# Patient Record
Sex: FEMALE | Race: OTHER | NOT HISPANIC OR LATINO | Employment: UNEMPLOYED | URBAN - METROPOLITAN AREA
[De-identification: names, ages, dates, MRNs, and addresses within clinical notes are randomized per-mention and may not be internally consistent; named-entity substitution may affect disease eponyms.]

---

## 2022-06-13 ENCOUNTER — HOSPITAL ENCOUNTER (EMERGENCY)
Facility: HOSPITAL | Age: 27
Discharge: HOME | End: 2022-06-13
Attending: EMERGENCY MEDICINE
Payer: MEDICARE

## 2022-06-13 VITALS
HEART RATE: 87 BPM | BODY MASS INDEX: 22.5 KG/M2 | DIASTOLIC BLOOD PRESSURE: 54 MMHG | HEIGHT: 66 IN | WEIGHT: 140 LBS | RESPIRATION RATE: 16 BRPM | TEMPERATURE: 100 F | OXYGEN SATURATION: 97 % | SYSTOLIC BLOOD PRESSURE: 97 MMHG

## 2022-06-13 DIAGNOSIS — R31.9 URINARY TRACT INFECTION WITH HEMATURIA, SITE UNSPECIFIED: ICD-10-CM

## 2022-06-13 DIAGNOSIS — F23 ACUTE PSYCHOSIS: Primary | ICD-10-CM

## 2022-06-13 DIAGNOSIS — Z00.8 MEDICAL CLEARANCE FOR PSYCHIATRIC ADMISSION: ICD-10-CM

## 2022-06-13 DIAGNOSIS — F19.10 POLYSUBSTANCE ABUSE: ICD-10-CM

## 2022-06-13 DIAGNOSIS — N39.0 URINARY TRACT INFECTION WITH HEMATURIA, SITE UNSPECIFIED: ICD-10-CM

## 2022-06-13 LAB
ALBUMIN SERPL BCP-MCNC: 3.7 G/DL (ref 3.5–5.2)
ALP SERPL-CCNC: 76 U/L (ref 55–135)
ALT SERPL W/O P-5'-P-CCNC: 25 U/L (ref 10–44)
ANION GAP SERPL CALC-SCNC: 8 MMOL/L (ref 8–16)
ANISOCYTOSIS BLD QL SMEAR: SLIGHT
APAP SERPL-MCNC: <3 UG/ML (ref 10–20)
AST SERPL-CCNC: 23 U/L (ref 10–40)
B-HCG UR QL: NEGATIVE
BASOPHILS # BLD AUTO: 0.03 K/UL (ref 0–0.2)
BASOPHILS NFR BLD: 0.3 % (ref 0–1.9)
BILIRUB SERPL-MCNC: 0.2 MG/DL (ref 0.1–1)
BUN SERPL-MCNC: 13 MG/DL (ref 6–20)
CALCIUM SERPL-MCNC: 9.4 MG/DL (ref 8.7–10.5)
CHLORIDE SERPL-SCNC: 113 MMOL/L (ref 95–110)
CO2 SERPL-SCNC: 21 MMOL/L (ref 23–29)
CREAT SERPL-MCNC: 0.7 MG/DL (ref 0.5–1.4)
CTP QC/QA: YES
CTP QC/QA: YES
DACRYOCYTES BLD QL SMEAR: ABNORMAL
DIFFERENTIAL METHOD: ABNORMAL
EOSINOPHIL # BLD AUTO: 0.1 K/UL (ref 0–0.5)
EOSINOPHIL NFR BLD: 1.4 % (ref 0–8)
ERYTHROCYTE [DISTWIDTH] IN BLOOD BY AUTOMATED COUNT: 15.6 % (ref 11.5–14.5)
EST. GFR  (AFRICAN AMERICAN): >60 ML/MIN/1.73 M^2
EST. GFR  (NON AFRICAN AMERICAN): >60 ML/MIN/1.73 M^2
ETHANOL SERPL-MCNC: <10 MG/DL
GLUCOSE SERPL-MCNC: 90 MG/DL (ref 70–110)
HCT VFR BLD AUTO: 37.1 % (ref 37–48.5)
HGB BLD-MCNC: 11.5 G/DL (ref 12–16)
HYPOCHROMIA BLD QL SMEAR: ABNORMAL
IMM GRANULOCYTES # BLD AUTO: 0.02 K/UL (ref 0–0.04)
IMM GRANULOCYTES NFR BLD AUTO: 0.2 % (ref 0–0.5)
LYMPHOCYTES # BLD AUTO: 1.9 K/UL (ref 1–4.8)
LYMPHOCYTES NFR BLD: 21.6 % (ref 18–48)
MCH RBC QN AUTO: 27.6 PG (ref 27–31)
MCHC RBC AUTO-ENTMCNC: 31 G/DL (ref 32–36)
MCV RBC AUTO: 89 FL (ref 82–98)
MONOCYTES # BLD AUTO: 1 K/UL (ref 0.3–1)
MONOCYTES NFR BLD: 11.1 % (ref 4–15)
NEUTROPHILS # BLD AUTO: 5.9 K/UL (ref 1.8–7.7)
NEUTROPHILS NFR BLD: 65.4 % (ref 38–73)
NRBC BLD-RTO: 0 /100 WBC
OVALOCYTES BLD QL SMEAR: ABNORMAL
PLATELET # BLD AUTO: 98 K/UL (ref 150–450)
PLATELET BLD QL SMEAR: ABNORMAL
PMV BLD AUTO: 12.2 FL (ref 9.2–12.9)
POIKILOCYTOSIS BLD QL SMEAR: SLIGHT
POTASSIUM SERPL-SCNC: 4.4 MMOL/L (ref 3.5–5.1)
PROT SERPL-MCNC: 6.5 G/DL (ref 6–8.4)
RBC # BLD AUTO: 4.16 M/UL (ref 4–5.4)
SALICYLATES SERPL-MCNC: <5 MG/DL (ref 15–30)
SARS-COV-2 RDRP RESP QL NAA+PROBE: NEGATIVE
SODIUM SERPL-SCNC: 142 MMOL/L (ref 136–145)
T4 FREE SERPL-MCNC: 1.03 NG/DL (ref 0.71–1.51)
TARGETS BLD QL SMEAR: ABNORMAL
TSH SERPL DL<=0.005 MIU/L-ACNC: 0.17 UIU/ML (ref 0.4–4)
WBC # BLD AUTO: 8.98 K/UL (ref 3.9–12.7)

## 2022-06-13 PROCEDURE — U0002 COVID-19 LAB TEST NON-CDC: HCPCS | Performed by: EMERGENCY MEDICINE

## 2022-06-13 PROCEDURE — G0425 INPT/ED TELECONSULT30: HCPCS | Mod: GT,,, | Performed by: PSYCHIATRY & NEUROLOGY

## 2022-06-13 PROCEDURE — 93010 EKG 12-LEAD: ICD-10-PCS | Mod: ,,, | Performed by: INTERNAL MEDICINE

## 2022-06-13 PROCEDURE — 84439 ASSAY OF FREE THYROXINE: CPT | Performed by: EMERGENCY MEDICINE

## 2022-06-13 PROCEDURE — 82077 ASSAY SPEC XCP UR&BREATH IA: CPT | Performed by: EMERGENCY MEDICINE

## 2022-06-13 PROCEDURE — 80179 DRUG ASSAY SALICYLATE: CPT | Performed by: EMERGENCY MEDICINE

## 2022-06-13 PROCEDURE — 96372 THER/PROPH/DIAG INJ SC/IM: CPT | Performed by: EMERGENCY MEDICINE

## 2022-06-13 PROCEDURE — 81025 URINE PREGNANCY TEST: CPT | Performed by: EMERGENCY MEDICINE

## 2022-06-13 PROCEDURE — 85025 COMPLETE CBC W/AUTO DIFF WBC: CPT | Performed by: EMERGENCY MEDICINE

## 2022-06-13 PROCEDURE — 93010 ELECTROCARDIOGRAM REPORT: CPT | Mod: ,,, | Performed by: INTERNAL MEDICINE

## 2022-06-13 PROCEDURE — G0425 PR INPT TELEHEALTH CONSULT 30M: ICD-10-PCS | Mod: GT,,, | Performed by: PSYCHIATRY & NEUROLOGY

## 2022-06-13 PROCEDURE — 80053 COMPREHEN METABOLIC PANEL: CPT | Performed by: EMERGENCY MEDICINE

## 2022-06-13 PROCEDURE — 93005 ELECTROCARDIOGRAM TRACING: CPT

## 2022-06-13 PROCEDURE — 99285 EMERGENCY DEPT VISIT HI MDM: CPT

## 2022-06-13 PROCEDURE — 63700000 PHARM REV CODE 250 ALT 637 W/O HCPCS: Performed by: EMERGENCY MEDICINE

## 2022-06-13 PROCEDURE — 84443 ASSAY THYROID STIM HORMONE: CPT | Performed by: EMERGENCY MEDICINE

## 2022-06-13 PROCEDURE — 25000003 PHARM REV CODE 250: Performed by: EMERGENCY MEDICINE

## 2022-06-13 PROCEDURE — 63600175 PHARM REV CODE 636 W HCPCS: Performed by: EMERGENCY MEDICINE

## 2022-06-13 PROCEDURE — 80143 DRUG ASSAY ACETAMINOPHEN: CPT | Performed by: EMERGENCY MEDICINE

## 2022-06-13 RX ORDER — OLANZAPINE 10 MG/2ML
10 INJECTION, POWDER, FOR SOLUTION INTRAMUSCULAR ONCE AS NEEDED
Status: DISCONTINUED | OUTPATIENT
Start: 2022-06-13 | End: 2022-06-13

## 2022-06-13 RX ORDER — CEFTRIAXONE 500 MG/1
1000 INJECTION, POWDER, FOR SOLUTION INTRAMUSCULAR; INTRAVENOUS
Status: COMPLETED | OUTPATIENT
Start: 2022-06-13 | End: 2022-06-13

## 2022-06-13 RX ORDER — CEPHALEXIN 500 MG/1
500 CAPSULE ORAL EVERY 12 HOURS
Qty: 14 CAPSULE | Refills: 0 | Status: ON HOLD | OUTPATIENT
Start: 2022-06-13 | End: 2022-06-17 | Stop reason: HOSPADM

## 2022-06-13 RX ORDER — METRONIDAZOLE 500 MG/1
2000 TABLET ORAL
Status: COMPLETED | OUTPATIENT
Start: 2022-06-13 | End: 2022-06-13

## 2022-06-13 RX ORDER — AZITHROMYCIN 250 MG/1
1000 TABLET, FILM COATED ORAL
Status: COMPLETED | OUTPATIENT
Start: 2022-06-13 | End: 2022-06-13

## 2022-06-13 RX ADMIN — METRONIDAZOLE 2000 MG: 500 TABLET ORAL at 07:06

## 2022-06-13 RX ADMIN — AZITHROMYCIN MONOHYDRATE 1000 MG: 250 TABLET ORAL at 07:06

## 2022-06-13 RX ADMIN — CEFTRIAXONE SODIUM 1000 MG: 500 INJECTION, POWDER, FOR SOLUTION INTRAMUSCULAR; INTRAVENOUS at 07:06

## 2022-06-13 NOTE — ED NOTES
Pt back to bed from bathroom without incident. Pt given meal tray and informed of need for completion of VS, EKG and blood draw. Pt verbalized understanding. Upon attempt to obtain pulse ox, pt became verbally aggressive and attempted to throw food at RN. Meal tray removed. Security remains at BS

## 2022-06-13 NOTE — ED NOTES
"Mood is labile. Pt. Is clapping and singing out loud "peanut butter and jelly". Pt. Was given a regular breakfast tray, removed from her when she attempted to throw food at staff.   "

## 2022-06-13 NOTE — ED NOTES
Telepsych. Consult placed through [facility] transfer center. Awaiting Dr. Sinclair to call.

## 2022-06-13 NOTE — ED NOTES
Pt became physically aggressive swinging closed fist at RN when attempting to obtain covid swab. Security called for assistance. Security to BS. Covid swab collected. Pt tolerated well. Will continue to monitor.

## 2022-06-13 NOTE — ED NOTES
Spoke to transfer center and pt. Accepted for transfer to [facility]. Unit. Transfer center will arrange transport. Facility requesting 1hr delay for report.

## 2022-06-13 NOTE — ED NOTES
"Pt. Arrives via [facility] ems after walking up to a police car and reporting that she wanted to be transported to hospital because her foot was itching after she was bitten by an Oyster. She reports that she is homeless, picked up on [address]. She is barefooted, disheveled and foul smelling. Mood is labile and pt. Yells out and curses staff and physician at times. She answers some random questions. She states she is on Seroquel for bipolar and no other meds.. she tells physician she is in a hotel and wants to sleep. She then states she is here because "Shrimp are eating my pussy". She does not answer orientation questions (current location, name, birthplace, family). She does not consistently verbalize organized thoughts. Rambles incoherently at times. Pt. Not credible historian during abuse/suicide/fall screening.   "

## 2022-06-13 NOTE — ED PROVIDER NOTES
"Encounter Date: 6/13/2022    SCRIBE #1 NOTE: I, Nicole Maier, am scribing for, and in the presence of, Dr. Anderson.       History     Chief Complaint   Patient presents with    other    homelessness     Homeless and walked up to police telling them her foot itches after she was bit by an oyster.      Lidia Ortiz is a 26 y.o. female presenting with other. Patient has a past medical history of Bipolar disorder. Patient states that she has been drinking today, but is unsure of how much. Patient then adds that she is here because "shrimp are living in my pussy." Patient also states that the ED "be a hotel" today.      The history is provided by the patient.     Review of patient's allergies indicates:  No Known Allergies  Past Medical History:   Diagnosis Date    Bipolar disorder, unspecified      No past surgical history on file.  No family history on file.     Review of Systems   Constitutional: Negative for fever.   HENT: Negative for sore throat.    Respiratory: Negative for shortness of breath.    Cardiovascular: Negative for chest pain.   Gastrointestinal: Negative for nausea.   Genitourinary: Negative for dysuria.   Musculoskeletal: Negative for back pain.   Skin: Negative for rash.   Neurological: Negative for weakness.   Hematological: Does not bruise/bleed easily.       Physical Exam     Initial Vitals   BP Pulse Resp Temp SpO2   06/13/22 0758 06/13/22 0758 06/13/22 0758 06/13/22 0934 --   90/62 87 18 97.8 °F (36.6 °C)       MAP       --                Physical Exam    Nursing note and vitals reviewed.  HENT:   Head: Atraumatic.   Eyes: Conjunctivae and EOM are normal.   Neck:   Normal range of motion.  Cardiovascular: Exam reveals no gallop and no friction rub.    No murmur heard.  Pulmonary/Chest: Breath sounds normal. No respiratory distress.   Abdominal: Abdomen is soft. There is no abdominal tenderness.   Musculoskeletal:         General: No edema. Normal range of motion.      Cervical back: Normal " range of motion.     Neurological: She is alert.   Moving all extremities   Psychiatric:   Patient not cooperative all questioning.  Bizarre affect.  Disorganized thought process.  Inappropriate laughter.         ED Course   Procedures  Labs Reviewed   CBC W/ AUTO DIFFERENTIAL - Abnormal; Notable for the following components:       Result Value    Hemoglobin 11.5 (*)     MCHC 31.0 (*)     RDW 15.6 (*)     Platelets 98 (*)     Platelet Estimate Decreased (*)     All other components within normal limits   COMPREHENSIVE METABOLIC PANEL - Abnormal; Notable for the following components:    Chloride 113 (*)     CO2 21 (*)     All other components within normal limits   TSH - Abnormal; Notable for the following components:    TSH 0.175 (*)     All other components within normal limits   URINALYSIS, REFLEX TO URINE CULTURE - Abnormal; Notable for the following components:    Appearance, UA Cloudy (*)     Specific Gravity, UA >1.030 (*)     Protein, UA 1+ (*)     Occult Blood UA 3+ (*)     Leukocytes, UA 3+ (*)     All other components within normal limits    Narrative:     Specimen Source->Urine   DRUG SCREEN PANEL, URINE EMERGENCY - Abnormal; Notable for the following components:    Cocaine (Metab.) Presumptive Positive (*)     THC Presumptive Positive (*)     All other components within normal limits    Narrative:     Specimen Source->Urine   URINALYSIS MICROSCOPIC - Abnormal; Notable for the following components:    RBC, UA >100 (*)     WBC, UA >100 (*)     Bacteria Many (*)     All other components within normal limits    Narrative:     Specimen Source->Urine   ACETAMINOPHEN LEVEL - Abnormal; Notable for the following components:    Acetaminophen (Tylenol), Serum <3.0 (*)     All other components within normal limits   SALICYLATE LEVEL - Abnormal; Notable for the following components:    Salicylate Lvl <5.0 (*)     All other components within normal limits   C. TRACHOMATIS/N. GONORRHOEAE BY AMP DNA   CULTURE, URINE  "  C. TRACHOMATIS/N. GONORRHOEAE BY AMP DNA   ALCOHOL,MEDICAL (ETHANOL)   T4, FREE   SARS-COV-2 RDRP GENE   POCT URINE PREGNANCY          Imaging Results    None          Medications   cefTRIAXone injection 1,000 mg (has no administration in time range)   azithromycin tablet 1,000 mg (has no administration in time range)   metroNIDAZOLE tablet 2,000 mg (has no administration in time range)     Medical Decision Making:   Initial Assessment:   26-year-old female presents via EMS for altered mental status.  Patient is not forthcoming at all with her complaints but randomly will say that "shrimps are living in my p*ssy."  She laughs inappropriately during the exam.  Unclear if she is having a mental health crisis or intoxicated.  PEC completed. EKG reviewed and interpreted myself shows no evidence of acute ischemia.  Urinalysis with evidence of infection.  UDS positive for THC and cocaine.  Patient's labs are otherwise unremarkable.  As patient is not cooperative with exam, will treat presumptively for cervicitis and Trichomonas.  Patient medically clear for psychiatric evaluation.                        Clinical Impression:   Final diagnoses:  [Z00.8] Medical clearance for psychiatric admission  [F23] Acute psychosis (Primary)  [F19.10] Polysubstance abuse  [N39.0, R31.9] Urinary tract infection with hematuria, site unspecified          ED Disposition Condition    Transfer to Psych Facility         ED Prescriptions     None        Follow-up Information    None          Saleem Anderson MD  06/13/22 1640    "

## 2022-06-13 NOTE — ED NOTES
Lab at bedside. Pt. Became agitated and aggressive, screaming and thrashing when stuck for labs. Lab was unable to complete blood collection. MD updated.

## 2022-06-13 NOTE — CONSULTS
"[facility]  Psychiatry  Telepsychiatry Consult Note    Please see previous notes:    Patient agreeable to consultation via telepsychiatry.    Tele-Consultation from Psychiatry started: 6/13/2022 at 1530  The chief complaint leading to psychiatric consultation is: Bizarre behavior  This consultation was requested by the Emergency Department attending physician.  The location of the consulting psychiatrist is [place], [place].  The patient location is  [facility] EMERGENCY DEPARTMENT   The patient arrived at the ED at: unknown  Also present with the patient at the time of the consultation: no one    Patient Identification:   Lidia Ortiz is a 26 y.o. female.    Patient information was obtained from patient, past medical records and ER records.  Patient presented involuntarily to the Emergency Department      Inpatient consult to Telemedicine - Psychiatry  Consult performed by: Gelacio Sinclair DO  Consult ordered by: Saleem Anderson MD        Consult Start Time: 06/13/2022 15:30 CDT  Consult End Time: 06/13/2022 16:15 CDT        Subjective:     History of Present Illness:  Per ED MD  Homeless and walked up to police telling them her foot itches after she was bit by an oyster.       Lidia Ortiz is a 26 y.o. female presenting with other. Patient has a past medical history of Bipolar disorder. Patient states that she has been drinking today, but is unsure of how much. Patient then adds that she is here because "shrimp are living in my pussy." Patient also states that the ED "be a hotel" today.    Psychiatric Evaluation  No previous psychiatric encounters documented. Patient with bizarre behavior and is unable to responds to my questions in a logical fashion. States she is in the hospital because "I have to brush my teeth." Utox pending.    Psychiatric History:   MYKE    Substance Abuse History:  MYKE    Legal History: Past charges/incarcerations: MYKE    Family Psychiatric History: MYKE      Social History:  MYKE    Psychiatric " Mental Status Exam:  Arousal: alert  Sensorium/Orientation: oriented to MYKE  Behavior/Cooperation: bizarre   Speech: increased latency of response  Language: not tested  Mood: MYKE  Affect: inappropriate  Thought Process: illogical  Thought Content:   Auditory hallucinations: MYKE  Visual hallucinations: MYKE  Paranoia: MYKE  Delusions:  YES:       Suicidal ideation: MYKE  Homicidal ideation: MYKE  Attention/Concentration:  intattentive  Memory:    Recent:  MYKE   Remote: MYKE     Fund of Knowledge: unable to assess   Abstract reasoning: MYKE  Insight: poor awareness of illness  Judgment: impaired due to psychosis      Past Medical History:   Past Medical History:   Diagnosis Date    Bipolar disorder, unspecified       Laboratory Data:   Labs Reviewed   CBC W/ AUTO DIFFERENTIAL - Abnormal; Notable for the following components:       Result Value    Hemoglobin 11.5 (*)     MCHC 31.0 (*)     RDW 15.6 (*)     Platelets 98 (*)     Platelet Estimate Decreased (*)     All other components within normal limits   COMPREHENSIVE METABOLIC PANEL - Abnormal; Notable for the following components:    Chloride 113 (*)     CO2 21 (*)     All other components within normal limits   TSH - Abnormal; Notable for the following components:    TSH 0.175 (*)     All other components within normal limits   URINALYSIS, REFLEX TO URINE CULTURE - Abnormal; Notable for the following components:    Appearance, UA Cloudy (*)     Specific Gravity, UA >1.030 (*)     Protein, UA 1+ (*)     Occult Blood UA 3+ (*)     Leukocytes, UA 3+ (*)     All other components within normal limits    Narrative:     Specimen Source->Urine   DRUG SCREEN PANEL, URINE EMERGENCY - Abnormal; Notable for the following components:    Cocaine (Metab.) Presumptive Positive (*)     THC Presumptive Positive (*)     All other components within normal limits    Narrative:     Specimen Source->Urine   URINALYSIS MICROSCOPIC - Abnormal; Notable for the following components:    RBC, UA  >100 (*)     WBC, UA >100 (*)     Bacteria Many (*)     All other components within normal limits    Narrative:     Specimen Source->Urine   ACETAMINOPHEN LEVEL - Abnormal; Notable for the following components:    Acetaminophen (Tylenol), Serum <3.0 (*)     All other components within normal limits   SALICYLATE LEVEL - Abnormal; Notable for the following components:    Salicylate Lvl <5.0 (*)     All other components within normal limits   C. TRACHOMATIS/N. GONORRHOEAE BY AMP DNA   CULTURE, URINE   C. TRACHOMATIS/N. GONORRHOEAE BY AMP DNA   ALCOHOL,MEDICAL (ETHANOL)   T4, FREE   SARS-COV-2 RDRP GENE   POCT URINE PREGNANCY       Neurological History:  Seizures: MYKE  Head trauma: MYKE    Allergies:   Review of patient's allergies indicates:  No Known Allergies    Medications in ER:   Medications   cefTRIAXone injection 1,000 mg (has no administration in time range)   azithromycin tablet 1,000 mg (has no administration in time range)   metroNIDAZOLE tablet 2,000 mg (has no administration in time range)       Medications at home:   Current Facility-Administered Medications:     azithromycin tablet 1,000 mg, 1,000 mg, Oral, ED 1 Time, Saleem Anderson MD    cefTRIAXone injection 1,000 mg, 1,000 mg, Intramuscular, ED 1 Time, Saleem Anderson MD    metroNIDAZOLE tablet 2,000 mg, 2,000 mg, Oral, ED 1 Time, Saleem Anderson MD    Current Outpatient Medications:     cephALEXin (KEFLEX) 500 MG capsule, Take 1 capsule (500 mg total) by mouth every 12 (twelve) hours. for 7 days, Disp: 14 capsule, Rfl: 0      No new subjective & objective note has been filed under this hospital service since the last note was generated.      Assessment - Diagnosis - Goals:     Diagnosis/Impression:   Unspecified Psychosis      RECOMMENDATIONS:     DISPOSITION: Once medically cleared;    Seek Involuntary Inpatient Psychiatric admission for stabilization of acute psychiatric symptoms and until a safe disposition plan is enacted. The pt &/or  their family was informed that the pt will be transferred to an Inpt unit per ED placement team.     PSYCHIATRIC MEDICATIONS  · Scheduled- Risperidone 0.5 mg PO BID  · PRN-  Olanzapine 10 mg PO/IM q 8 hrs for non-redirectable agitation    LEGAL   Seek/continue PEC because pt is gravely disabled. Please provide with 1:1 sitter.     More than 50% of the time was spent counseling/coordinating care    Consulting clinician was informed of the encounter and consult note.      Gelacio Sinclair,    Psychiatry  [facility]

## 2022-06-14 NOTE — ED NOTES
Pt. Transported to vehicle with transport personal and hospital security without incidence. Pt. Is stable without distress.

## 2022-06-14 NOTE — ED NOTES
SPD here for transport. Security notified. Pt. Is awake, calm. Continues to have rambling and disorganized thought processes. She is cooperative, tolerating p.o. fluids well. Assisted pt. To ambulate to bathroom.

## 2022-06-21 PROBLEM — F30.2: Status: ACTIVE | Noted: 2022-06-21
